# Patient Record
Sex: FEMALE | Race: BLACK OR AFRICAN AMERICAN | Employment: UNEMPLOYED | URBAN - METROPOLITAN AREA
[De-identification: names, ages, dates, MRNs, and addresses within clinical notes are randomized per-mention and may not be internally consistent; named-entity substitution may affect disease eponyms.]

---

## 2017-01-09 ENCOUNTER — OFFICE VISIT (OUTPATIENT)
Dept: OTOLARYNGOLOGY | Facility: CLINIC | Age: 42
End: 2017-01-09
Attending: NURSE PRACTITIONER
Payer: COMMERCIAL

## 2017-01-09 ENCOUNTER — OFFICE VISIT (OUTPATIENT)
Dept: AUDIOLOGY | Facility: CLINIC | Age: 42
End: 2017-01-09
Payer: COMMERCIAL

## 2017-01-09 DIAGNOSIS — H90.8 MIXED HEARING LOSS: Primary | ICD-10-CM

## 2017-01-09 DIAGNOSIS — H90.8 MIXED HEARING LOSS, UNILATERAL: Primary | ICD-10-CM

## 2017-01-09 PROCEDURE — 99203 OFFICE O/P NEW LOW 30 MIN: CPT | Mod: 25 | Performed by: OTOLARYNGOLOGY

## 2017-01-09 PROCEDURE — 92504 EAR MICROSCOPY EXAMINATION: CPT | Performed by: OTOLARYNGOLOGY

## 2017-01-09 PROCEDURE — 92557 COMPREHENSIVE HEARING TEST: CPT | Performed by: AUDIOLOGIST

## 2017-01-09 PROCEDURE — 92567 TYMPANOMETRY: CPT | Performed by: AUDIOLOGIST

## 2017-01-09 ASSESSMENT — PAIN SCALES - GENERAL: PAINLEVEL: NO PAIN (0)

## 2017-01-09 NOTE — NURSING NOTE
"Marcy Villa's goals for this visit include:   Chief Complaint   Patient presents with     Consult     Hearing loss left ear x8 years       She requests these members of her care team be copied on today's visit information: Janelle Paz      PCP: Janelle Paz    Referring Provider:  Salina Villeda MD  [address]  [place] [address]    Chief Complaint   Patient presents with     Consult     Hearing loss left ear x8 years       Initial There were no vitals taken for this visit. Estimated body mass index is 28.81 kg/(m^2) as calculated from the following:    Height as of 12/2/16: 1.638 m (5' 4.5\").    Weight as of 12/2/16: 77.293 kg (170 lb 6.4 oz).  BP completed using cuff size: NA (Not Taken)    "

## 2017-01-09 NOTE — PROGRESS NOTES
Otolaryngology Adult Consultation    Patient: Marcy Villa  : 1975      IHPI:  Marcy Villa is a 42 year old female seen today in the Otolaryngology Clinic for decreased hearing in the right year.  She has noticed this for about one year.  No history of noise exposure.  She denies pain or drainage on that side.  She is able to use the phone but notices a distinct difference in how things sound from side to side.  Has a history of ear surgery on the left.  She was told an permanent ear tube was placed but people would not be able to see it because it is inside.      Medications:  No current outpatient prescriptions on file.    Allergies: Seasonal allergies     PMH:  Past Medical History   Diagnosis Date     NVD (normal vaginal delivery)      x8       PSH:  Past Surgical History   Procedure Laterality Date     C rad resec tonsil/pillars       Pe tubes Left        FH:  Family History   Problem Relation Age of Onset     DIABETES No family hx of      Hypertension No family hx of      Breast Cancer No family hx of      Cancer - colorectal No family hx of      Asthma No family hx of      C.A.D. No family hx of      CEREBROVASCULAR DISEASE No family hx of      Prostate Cancer No family hx of      Thyroid Disease No family hx of      Lipids No family hx of      Connective Tissue Disorder No family hx of         SH:  Social History   Substance Use Topics     Smoking status: Never Smoker      Smokeless tobacco: Never Used     Alcohol Use: No       Review of Systems  No flowsheet data found.    Physical Exam:    GEN:  The patient is alert, oriented and in no acute distress.  HEAD:  Head, face scalp is grossly normal.  EARS:  Under the binocular microscope, the ears were visualized.  Ears demonstrate normal canals, auricles and tympanic membranes.  The left TM looks to have had a cartilage graft posteriorly.  Art midline  ; AC > BC bilaterally  Pertinent previous labs/tests:  AUDIO: 17  Left: WNL sloping to moderate  SNHL  Right: WNL sloping to moderate mixed loss on the right.  Type A tymps B    Assessment/Plan:  Right mixed hearing loss.  Differential diagnosis includes otosclerosis vs ossicular fixation.  At this time, I would recommend observation.  She is not a surgical candidate at this time.  I would recommend yearly audiograms.    Greater than 35 minutes were spent on this visit, of which more than 50% was spent on counseling on otosclerosis and management

## 2017-01-09 NOTE — PROGRESS NOTES
AUDIOLOGY REPORT    SUMMARY: Audiology visit completed. See audiogram for results.    RECOMMENDATIONS: Follow-up with ENT.    Tien Fontanez  Doctor of Audiology  [place] License # [id number]

## 2017-04-20 ENCOUNTER — OFFICE VISIT (OUTPATIENT)
Dept: FAMILY MEDICINE | Facility: CLINIC | Age: 42
End: 2017-04-20
Payer: COMMERCIAL

## 2017-04-20 VITALS — DIASTOLIC BLOOD PRESSURE: 58 MMHG | SYSTOLIC BLOOD PRESSURE: 101 MMHG | TEMPERATURE: 97.8 F

## 2017-04-20 DIAGNOSIS — Z23 NEED FOR VACCINATION: ICD-10-CM

## 2017-04-20 DIAGNOSIS — Z71.84 TRAVEL ADVICE ENCOUNTER: Primary | ICD-10-CM

## 2017-04-20 PROCEDURE — 90734 MENACWYD/MENACWYCRM VACC IM: CPT | Mod: GA | Performed by: NURSE PRACTITIONER

## 2017-04-20 PROCEDURE — 90471 IMMUNIZATION ADMIN: CPT | Mod: GA | Performed by: NURSE PRACTITIONER

## 2017-04-20 PROCEDURE — 99401 PREV MED CNSL INDIV APPRX 15: CPT | Mod: 25 | Performed by: NURSE PRACTITIONER

## 2017-04-20 NOTE — PROGRESS NOTES
Nurse Note      Itinerary:  [place]       Departure Date: 05/01/2017       Return Date: 05/12/2017      Length of Trip 10 days      Reason for Travel: [facility] purposes ([facility])           Urban or rural: both      Accommodations: Hotel and Friends         IMMUNIZATION HISTORY  Have you received any immunizations within the past 4 weeks?  No  Have you ever fainted from having your blood drawn or from an injection?  No  Have you ever had a fever reaction to vaccination?  No  Have you ever had any bad reaction or side effect from any vaccination?  No  Have you ever had hepatitis A or B vaccine?  Yes  Do you live (or work closely) with anyone who has AIDS, an AIDS-like condition, any other immune disorder or who is on chemotherapy for cancer?  No  Do you have a family history of immunodeficiency?  No  Have you received any injection of immune globulin or any blood products during the past 12 months?  No    Patient roomed by ALYSSA Wiggins  Marcy Villa is a 42 year old female seen today with her son for counsultation for international travel to [facility] for [facility].  Patient will be departing in  2 week(s) and staying for   10 day(s) and  traveling with family member(s).      Patient itinerary :  will be in the [facility] region of [facility] which presents risk for Dengue Fever, food borne illnesses and Leishmaniasis. exposure.      Patient's activities will include Pilgrimage.    Patient's country of birth is [place]    Special medical concerns: none  Pre-travel questionnaire was completed by patient and reviewed by provider.     Vitals: /58  Temp 97.8  F (36.6  C) (Oral)  Breastfeeding? No  BMI= There is no height or weight on file to calculate BMI.    EXAM:  General:  Well-nourished, well-developed in no acute distress.  Appears to be stated age, interacts appropriately and expresses understanding of information given to patient.    No current outpatient prescriptions on file.      Patient Active Problem List   Diagnosis     Anemia     CARDIOVASCULAR SCREENING; LDL GOAL LESS THAN 160     Seasonal allergies     Pain of left lower extremity     Knee pain, unspecified laterality     Lumbar radiculopathy     Allergies   Allergen Reactions     Seasonal Allergies          Immunizations discussed include:   Hepatitis A:  Immune  Hepatitis B: Declined  deferred  Influenza: Declined  Not concerned about risk of disease  Is concerned about risks of vaccine despite discussion of facts vs myths  Typhoid: low risk  Rabies: Insufficient time to vaccinate  Yellow Fever: Not indicated  Japanese Encephalitis: Not indicated  Meningococcus: Ordered/given today, risks, benefits and side effects reviewed  Tetanus/Diphtheria: Up to date  Measles/Mumps/Rubella: Up to date  Cholera: Not needed  Polio: Up to date  Pneumococcal: Under age of 65  Varicella: Up to date  Zostavax:  Not indicated  HPV:  Not indicated  TB:  Low risk     Altitude Exposure on this trip: No    ASSESSMENT/PLAN:    ICD-10-CM    1. Travel advice encounter Z71.89 MENINGOCOCCAL VACCINE,IM (MENACTRA)   2. Need for vaccination Z23 MENINGOCOCCAL VACCINE,IM (MENACTRA)     I have reviewed general recommendations for safe travel   including: food/water precautions, insect precautions, safer sex   practices given high prevalence of Zika, HIV and other STDs,   roadway safety. Educational materials and Travax report provided.    Malaraia prophylaxis recommended: none  Symptomatic treatment for traveler's diarrhea: loperamide/diphenoxylate  Altitude illness prevention and treatment: no      Evacuation insurance advised and resources were provided to patient.    Total visit time 20 minutes  with over 50% of time spent counseling patient as detailed above.    Bita Felder CNP

## 2017-04-20 NOTE — MR AVS SNAPSHOT
"              After Visit Summary   4/20/2017    Marcy Villa    MRN: [id number]           Patient Information     Date Of Birth          1975        Visit Information        Provider Department      4/20/2017 9:00 AM Bita Felder APRN CNP [facility]        Today's Diagnoses     Travel advice encounter    -  1    Need for vaccination          Care Instructions    Today April 20, 2017 you received the    Meningococcal (Menactra) Vaccine  .    These appointments can be made as a NURSE ONLY visit.    **It is very important for the vaccinations to be given on the scheduled day(s), this helps ensure you receive the full effectiveness of the vaccine.**    Please call [facility] with any questions [phone]    Thank you for visiting [facility]            Follow-ups after your visit        Who to contact     If you have questions or need follow up information about today's clinic visit or your schedule please contact [facility] directly at [phone].  Normal or non-critical lab and imaging results will be communicated to you by MarketBridgehart, letter or phone within 4 business days after the clinic has received the results. If you do not hear from us within 7 days, please contact the clinic through SpaBoomt or phone. If you have a critical or abnormal lab result, we will notify you by phone as soon as possible.  Submit refill requests through [employer] or call your pharmacy and they will forward the refill request to us. Please allow 3 business days for your refill to be completed.          Additional Information About Your Visit        MarketBridgeharedulio Information     [employer] lets you send messages to your doctor, view your test results, renew your prescriptions, schedule appointments and more. To sign up, go to [email or web address] . Click on \"Log in\" on the left side of the screen, which will take you to the Welcome page. Then click on \"Sign up Now\" on " the right side of the page.     You will be asked to enter the access code listed below, as well as some personal information. Please follow the directions to create your username and password.     Your access code is: [email or web address]  Expires: 2017  9:39 AM     Your access code will  in 90 days. If you need help or a new code, please call your [place] clinic or [phone].        Care EveryWhere ID     This is your Care EveryWhere ID. This could be used by other organizations to access your [place] medical records  [id number]        Your Vitals Were     Temperature Breastfeeding?                97.8  F (36.6  C) (Oral) No           Blood Pressure from Last 3 Encounters:   17 101/58   16 100/60   08/28/15 102/69    Weight from Last 3 Encounters:   16 170 lb 6.4 oz (77.3 kg)   08/28/15 178 lb 11.2 oz (81.1 kg)   14 172 lb 6.4 oz (78.2 kg)              We Performed the Following     MENINGOCOCCAL VACCINE,IM (MENACTRA)        Primary Care Provider Office Phone # Fax #    Janelle RADHA [facility] [phone]       [facility] [address]  [place] [address]        Thank you!     Thank you for choosing [facility]  for your care. Our goal is always to provide you with excellent care. Hearing back from our patients is one way we can continue to improve our services. Please take a few minutes to complete the written survey that you may receive in the mail after your visit with us. Thank you!             Your Updated Medication List - Protect others around you: Learn how to safely use, store and throw away your medicines at www.disposemymeds.org.      Notice  As of 2017  9:40 AM    You have not been prescribed any medications.

## 2017-04-20 NOTE — PATIENT INSTRUCTIONS
Today April 20, 2017 you received the    Meningococcal (Menactra) Vaccine  .    These appointments can be made as a NURSE ONLY visit.    **It is very important for the vaccinations to be given on the scheduled day(s), this helps ensure you receive the full effectiveness of the vaccine.**    Please call [facility] with any questions [phone]    Thank you for visiting [facility]

## 2020-10-14 ENCOUNTER — DOCUMENTATION ONLY (OUTPATIENT)
Dept: LAB | Facility: CLINIC | Age: 45
End: 2020-10-14

## 2025-08-05 ENCOUNTER — PATIENT OUTREACH (OUTPATIENT)
Dept: CARE COORDINATION | Facility: CLINIC | Age: 50
End: 2025-08-05
Payer: COMMERCIAL

## 2025-08-19 ENCOUNTER — PATIENT OUTREACH (OUTPATIENT)
Dept: CARE COORDINATION | Facility: CLINIC | Age: 50
End: 2025-08-19
Payer: COMMERCIAL